# Patient Record
Sex: FEMALE | Race: WHITE | NOT HISPANIC OR LATINO | Employment: STUDENT | URBAN - METROPOLITAN AREA
[De-identification: names, ages, dates, MRNs, and addresses within clinical notes are randomized per-mention and may not be internally consistent; named-entity substitution may affect disease eponyms.]

---
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
## 2024-01-09 ENCOUNTER — OFFICE VISIT (OUTPATIENT)
Dept: OBSTETRICS AND GYNECOLOGY | Facility: CLINIC | Age: 19
End: 2024-01-09
Payer: COMMERCIAL

## 2024-01-09 VITALS
WEIGHT: 187 LBS | DIASTOLIC BLOOD PRESSURE: 72 MMHG | SYSTOLIC BLOOD PRESSURE: 118 MMHG | HEIGHT: 65 IN | BODY MASS INDEX: 31.16 KG/M2

## 2024-01-09 DIAGNOSIS — Z01.419 ROUTINE GYNECOLOGICAL EXAMINATION: Primary | ICD-10-CM

## 2024-01-09 RX ORDER — NORETHINDRONE ACETATE AND ETHINYL ESTRADIOL 1MG-20(21)
1 KIT ORAL DAILY
Qty: 84 TABLET | Refills: 3 | Status: SHIPPED | OUTPATIENT
Start: 2024-01-09

## 2024-01-09 NOTE — PROGRESS NOTES
"        Chief Complaint   Patient presents with    Gynecologic Exam     Birth control refill           Subjective   HPI  Lexi Agrawal is a 18 y.o. female, , Patient's last menstrual period was 2023 (approximate). who presents for routine follow up on her birth control refill. She is currently using birth control for irregular periods.    With her birth control her periods are regular every 28-30 days, lasting 5 days. Dysmenorrhea:moderate, occurring premenstrually and first 1-2 days of flow.  Partner Status: Marital Status: single.  The patient's current method of contraception is contraceptive methods: Condoms.  She is satisfied with her current method of birth control. The patient reports additional symptoms as none.      She is sexually active. She has had new partners.. STD testing recommendations have been explained to the patient and she does desire STD testing.    Additional OB/GYN History     OB History          0    Para   0    Term   0       0    AB   0    Living   0         SAB   0    IAB   0    Ectopic   0    Molar   0    Multiple   0    Live Births   0                The additional following portions of the patient's history were reviewed and updated as appropriate: allergies, current medications, past family history, past medical history, past social history, past surgical history, and problem list.    Review of Systems   All other systems reviewed and are negative.      I have reviewed and agree with the HPI, ROS, and historical information as entered above. Vonnie Zambrano MD      Objective   /72   Ht 165.1 cm (65\")   Wt 84.8 kg (187 lb)   LMP 2023 (Approximate)   BMI 31.12 kg/m²     Physical Exam  General:  well developed; well nourished  no acute distress  Skin:  No suspicious lesions seen  Thyroid: normal to inspection and palpation  CVS: RRR, no M/R/G, distal pulses wnl  Resp: CTAB, No W/R/R  Abdomen: soft, non-tender; no masses  no umbilical or " inguinal hernias are present  no hepato-splenomegaly  Pelvis: Clinical staff was present for exam  External genitalia:  normal appearance of the external genitalia including Bartholin's and [place]'s glands.  Urethra: no masses or tenderness  Urethral meatus: normal size;  No lesions or signs of prolapse  Bladder: non tender to palpation, no masses, no prolapse  Vagina:  normal pink mucosa without prolapse or lesions.  Cervix:  normal appearance.  Uterus:  normal size, shape and consistency.  Adnexa:  normal bimanual exam of the adnexa.  Perineum/Anus: normal appearance, no external hemorrhoids  Ext: 2+ pulses, no edema     Assessment & Plan     Assessment     Problem List Items Addressed This Visit       Routine gynecological examination - Primary    Relevant Orders    Chlamydia trachomatis, Neisseria gonorrhoeae, Trichomonas vaginalis, PCR - Swab, Cervix (Completed)       Medication(s) Ordered  Counseling on alternative methods of birth control provided  Counseling on use of oral contraceptives provided  Counseling on use of condoms provided  Reviewed importance of self breast exam and breast health, importance of medication compliance , safe sex, and encouraged HPV vaccine      Vonnie Zambrano MD  01/09/2024

## 2024-10-29 RX ORDER — NORETHINDRONE ACETATE AND ETHINYL ESTRADIOL AND FERROUS FUMARATE 1MG-20(21)
1 KIT ORAL DAILY
Qty: 84 TABLET | Refills: 3 | Status: SHIPPED | OUTPATIENT
Start: 2024-10-29

## 2025-01-13 ENCOUNTER — OFFICE VISIT (OUTPATIENT)
Dept: OBSTETRICS AND GYNECOLOGY | Facility: CLINIC | Age: 20
End: 2025-01-13
Payer: COMMERCIAL

## 2025-01-13 VITALS
BODY MASS INDEX: 34.49 KG/M2 | SYSTOLIC BLOOD PRESSURE: 100 MMHG | DIASTOLIC BLOOD PRESSURE: 72 MMHG | WEIGHT: 207 LBS | HEIGHT: 65 IN

## 2025-01-13 DIAGNOSIS — N94.6 DYSMENORRHEA: ICD-10-CM

## 2025-01-13 DIAGNOSIS — Z01.419 ROUTINE GYNECOLOGICAL EXAMINATION: Primary | ICD-10-CM

## 2025-01-13 NOTE — PROGRESS NOTES
Gynecologic Annual Exam Note        Annual Exam        Subjective     HPI  Lexi Agrawal is a 19 y.o.  female who presents for annual well woman exam as a established patient. There were no changes to her medical or surgical history since her last visit.. Patient's last menstrual period was 2024 (approximate). Her periods occur every 30 days, lasting 4-5 days.  The flow is light to moderate. She reports dysmenorrhea is mild occurring premenstrually and first 1-2 days of flow.     Marital Status: single.  She is sexually active. She has not had new partners.. STD testing recommendations have been explained to the patient and she does desire STD testing.    The patient would like to discuss the following complaints today: none    Additional OB/GYN History   contraceptive methods: OCP (estrogen/progesterone)  Desires to: continue contraception  Thromboembolic Disease: none  History of migraines: no      History of STD: no    Last Pap : never.   Last Completed Pap Smear       This patient has no relevant Health Maintenance data.             History of abnormal Pap smear: no  Gardasil status:completed - patient unsure  Family history of uterine, colon, breast, or ovarian cancer: no  Performs monthly Self-Breast Exam: no  Exercises Regularly:no  Feelings of Anxiety or Depression: no  Tobacco Usage?: No       Current Outpatient Medications:     Junel FE 1/20 1-20 MG-MCG per tablet, Take 1 tablet by mouth Daily., Disp: 84 tablet, Rfl: 3     Patient is requesting refills of OCP.    OB History          0    Para   0    Term   0       0    AB   0    Living   0         SAB   0    IAB   0    Ectopic   0    Molar   0    Multiple   0    Live Births   0                Health Maintenance   Topic Date Due    BMI FOLLOWUP  Never done    HPV VACCINES (1 - 3-dose series) Never done    HEPATITIS C SCREENING  Never done    ANNUAL PHYSICAL  Never done    INFLUENZA VACCINE  2024    COVID-19 Vaccine (1 -  "2024-25 season) Never done    TDAP/TD VACCINES (2 - Td or Tdap) 09/15/2025    CHLAMYDIA SCREENING  01/13/2026    MENINGOCOCCAL VACCINE  Completed    Pneumococcal Vaccine 0-64  Aged Out       Past Medical History:   Diagnosis Date    Migraines     Seasonal allergies         Past Surgical History:   Procedure Laterality Date    NO PAST SURGERIES         The additional following portions of the patient's history were reviewed and updated as appropriate: allergies, current medications, past family history, past medical history, past social history, past surgical history, and problem list.    Review of Systems   All other systems reviewed and are negative.        I have reviewed and agree with the HPI, ROS, and historical information as entered above. Vonnie Zambrano MD          Objective   /72   Ht 165.1 cm (65\")   Wt 93.9 kg (207 lb)   LMP 12/16/2024 (Approximate)   BMI 34.45 kg/m²     Physical Exam  General:  well developed; well nourished  no acute distress  Skin:  No suspicious lesions seen  Thyroid: normal to inspection and palpation  Breasts:  Examined in supine position  Symmetric without masses or skin dimpling  Nipples normal without inversion, lesions or discharge  There are no palpable axillary nodes  CVS: RRR, no M/R/G, distal pulses wnl  Resp: CTAB, No W/R/R  Abdomen: soft, non-tender; no masses  no umbilical or inguinal hernias are present  no hepato-splenomegaly  Pelvis: Clinical staff was present for exam  External genitalia:  normal appearance of the external genitalia including Bartholin's and Ona's glands.  Urethra: no masses or tenderness  Urethral meatus: normal size;  No lesions or signs of prolapse  Bladder: non tender to palpation, no masses, no prolapse  Vagina:  normal pink mucosa without prolapse or lesions.  Cervix:  normal appearance.  Uterus:  normal size, shape and consistency.  Adnexa:  normal bimanual exam of the adnexa.  Perineum/Anus: normal appearance, no external " hemorrhoids  Ext: 2+ pulses, no edema      Assessment and Plan    Problem List Items Addressed This Visit       Dysmenorrhea    Routine gynecological examination - Primary    Relevant Orders    Chlamydia trachomatis, Neisseria gonorrhoeae, Trichomonas vaginalis, PCR - Swab, Cervix (Completed)       GYN annual well woman exam.   Reviewed pap guidelines.   Encouraged use of condoms for STD prevention.  OCP's/Vaginal Ring - Discussed side effects of nausea, BTB, headaches, breast tenderness and slight weight gain in the first three cycles.  Understands risks of blood clots, stroke, and theoretical risk of breast cancer.  Denies family history of blood clots.  Reviewed monthly self breast exams.  Instructed to call with lumps, pain, or breast discharge.    Reviewed exercise as a preventative health measures.   Return in about 1 year (around 1/13/2026) for Appropriate for followup with NP as desired..    Vonnie Zambrano MD  01/13/2025

## 2025-01-16 RX ORDER — NORETHINDRONE ACETATE AND ETHINYL ESTRADIOL AND FERROUS FUMARATE 1MG-20(21)
1 KIT ORAL DAILY
Qty: 84 TABLET | Refills: 3 | Status: SHIPPED | OUTPATIENT
Start: 2025-01-16